# Patient Record
Sex: FEMALE | Race: WHITE | NOT HISPANIC OR LATINO | Employment: OTHER | URBAN - METROPOLITAN AREA
[De-identification: names, ages, dates, MRNs, and addresses within clinical notes are randomized per-mention and may not be internally consistent; named-entity substitution may affect disease eponyms.]

---

## 2023-04-10 LAB
ALANINE AMINOTRANSFERASE (SGPT) (U/L) IN SER/PLAS: 11 U/L (ref 7–45)
ALBUMIN (G/DL) IN SER/PLAS: 2.7 G/DL (ref 3.4–5)
ALKALINE PHOSPHATASE (U/L) IN SER/PLAS: 126 U/L (ref 33–136)
ANION GAP IN SER/PLAS: 10 MMOL/L (ref 10–20)
ASPARTATE AMINOTRANSFERASE (SGOT) (U/L) IN SER/PLAS: 15 U/L (ref 9–39)
BASOPHILS (10*3/UL) IN BLOOD BY AUTOMATED COUNT: 0.04 X10E9/L (ref 0–0.1)
BASOPHILS/100 LEUKOCYTES IN BLOOD BY AUTOMATED COUNT: 0.4 % (ref 0–2)
BILIRUBIN TOTAL (MG/DL) IN SER/PLAS: 0.4 MG/DL (ref 0–1.2)
CALCIUM (MG/DL) IN SER/PLAS: 8.4 MG/DL (ref 8.6–10.3)
CARBON DIOXIDE, TOTAL (MMOL/L) IN SER/PLAS: 29 MMOL/L (ref 21–32)
CHLORIDE (MMOL/L) IN SER/PLAS: 101 MMOL/L (ref 98–107)
CHOLESTEROL (MG/DL) IN SER/PLAS: 99 MG/DL (ref 0–199)
CHOLESTEROL IN HDL (MG/DL) IN SER/PLAS: 45.1 MG/DL
CHOLESTEROL/HDL RATIO: 2.2
CREATININE (MG/DL) IN SER/PLAS: 0.73 MG/DL (ref 0.5–1.05)
EOSINOPHILS (10*3/UL) IN BLOOD BY AUTOMATED COUNT: 0.05 X10E9/L (ref 0–0.4)
EOSINOPHILS/100 LEUKOCYTES IN BLOOD BY AUTOMATED COUNT: 0.5 % (ref 0–6)
ERYTHROCYTE DISTRIBUTION WIDTH (RATIO) BY AUTOMATED COUNT: 15.7 % (ref 11.5–14.5)
ERYTHROCYTE MEAN CORPUSCULAR HEMOGLOBIN CONCENTRATION (G/DL) BY AUTOMATED: 32.9 G/DL (ref 32–36)
ERYTHROCYTE MEAN CORPUSCULAR VOLUME (FL) BY AUTOMATED COUNT: 104 FL (ref 80–100)
ERYTHROCYTES (10*6/UL) IN BLOOD BY AUTOMATED COUNT: 4.06 X10E12/L (ref 4–5.2)
GFR FEMALE: 83 ML/MIN/1.73M2
GLUCOSE (MG/DL) IN SER/PLAS: 124 MG/DL (ref 74–99)
HEMATOCRIT (%) IN BLOOD BY AUTOMATED COUNT: 42.3 % (ref 36–46)
HEMOGLOBIN (G/DL) IN BLOOD: 13.9 G/DL (ref 12–16)
IMMATURE GRANULOCYTES/100 LEUKOCYTES IN BLOOD BY AUTOMATED COUNT: 0.5 % (ref 0–0.9)
IRON (UG/DL) IN SER/PLAS: 39 UG/DL (ref 35–150)
IRON BINDING CAPACITY (UG/DL) IN SER/PLAS: 149 UG/DL (ref 240–445)
IRON SATURATION (%) IN SER/PLAS: 26 % (ref 25–45)
LDL: 36 MG/DL (ref 0–99)
LEUKOCYTES (10*3/UL) IN BLOOD BY AUTOMATED COUNT: 9.5 X10E9/L (ref 4.4–11.3)
LYMPHOCYTES (10*3/UL) IN BLOOD BY AUTOMATED COUNT: 2.49 X10E9/L (ref 0.8–3)
LYMPHOCYTES/100 LEUKOCYTES IN BLOOD BY AUTOMATED COUNT: 26.1 % (ref 13–44)
MONOCYTES (10*3/UL) IN BLOOD BY AUTOMATED COUNT: 0.74 X10E9/L (ref 0.05–0.8)
MONOCYTES/100 LEUKOCYTES IN BLOOD BY AUTOMATED COUNT: 7.8 % (ref 2–10)
NEUTROPHILS (10*3/UL) IN BLOOD BY AUTOMATED COUNT: 6.16 X10E9/L (ref 1.6–5.5)
NEUTROPHILS/100 LEUKOCYTES IN BLOOD BY AUTOMATED COUNT: 64.7 % (ref 40–80)
PLATELETS (10*3/UL) IN BLOOD AUTOMATED COUNT: 466 X10E9/L (ref 150–450)
POTASSIUM (MMOL/L) IN SER/PLAS: 3 MMOL/L (ref 3.5–5.3)
PROTEIN TOTAL: 5.9 G/DL (ref 6.4–8.2)
SODIUM (MMOL/L) IN SER/PLAS: 137 MMOL/L (ref 136–145)
THYROTROPIN (MIU/L) IN SER/PLAS BY DETECTION LIMIT <= 0.05 MIU/L: 5.99 MIU/L (ref 0.44–3.98)
THYROXINE (T4) FREE (NG/DL) IN SER/PLAS: 0.8 NG/DL (ref 0.61–1.12)
TRIGLYCERIDE (MG/DL) IN SER/PLAS: 91 MG/DL (ref 0–149)
UREA NITROGEN (MG/DL) IN SER/PLAS: 17 MG/DL (ref 6–23)
VLDL: 18 MG/DL (ref 0–40)

## 2023-04-21 ENCOUNTER — TELEPHONE (OUTPATIENT)
Dept: PRIMARY CARE | Facility: CLINIC | Age: 78
End: 2023-04-21
Payer: COMMERCIAL

## 2023-05-07 ENCOUNTER — NURSING HOME VISIT (OUTPATIENT)
Dept: POST ACUTE CARE | Facility: EXTERNAL LOCATION | Age: 78
End: 2023-05-07
Payer: COMMERCIAL

## 2023-05-07 DIAGNOSIS — R19.00 ABDOMINAL MASS, UNSPECIFIED ABDOMINAL LOCATION: ICD-10-CM

## 2023-05-07 DIAGNOSIS — Z93.1 FEEDING BY G-TUBE (MULTI): ICD-10-CM

## 2023-05-07 DIAGNOSIS — K52.9 CHRONIC DIARRHEA OF UNKNOWN ORIGIN: ICD-10-CM

## 2023-05-07 DIAGNOSIS — I10 BENIGN ESSENTIAL HTN: ICD-10-CM

## 2023-05-07 DIAGNOSIS — I35.0 MODERATE AORTIC STENOSIS: ICD-10-CM

## 2023-05-07 DIAGNOSIS — E46 PROTEIN-CALORIE MALNUTRITION, UNSPECIFIED SEVERITY (MULTI): ICD-10-CM

## 2023-05-07 DIAGNOSIS — M81.0 AGE-RELATED OSTEOPOROSIS WITHOUT CURRENT PATHOLOGICAL FRACTURE: ICD-10-CM

## 2023-05-07 DIAGNOSIS — K43.9 VENTRAL HERNIA WITHOUT OBSTRUCTION OR GANGRENE: ICD-10-CM

## 2023-05-07 DIAGNOSIS — Z93.3 COLOSTOMY IN PLACE (MULTI): ICD-10-CM

## 2023-05-07 DIAGNOSIS — K56.609 SBO (SMALL BOWEL OBSTRUCTION) (MULTI): Primary | ICD-10-CM

## 2023-05-07 DIAGNOSIS — R53.81 PHYSICAL DEBILITY: ICD-10-CM

## 2023-05-07 DIAGNOSIS — K56.7 ILEUS (MULTI): ICD-10-CM

## 2023-05-07 PROBLEM — S22.42XA MULTIPLE CLOSED FRACTURES OF RIBS OF LEFT SIDE: Status: ACTIVE | Noted: 2023-05-07

## 2023-05-07 PROBLEM — K46.9 ABDOMINAL HERNIA: Status: ACTIVE | Noted: 2023-05-07

## 2023-05-07 PROBLEM — R63.4 WEIGHT LOSS, UNINTENTIONAL: Status: ACTIVE | Noted: 2023-05-07

## 2023-05-07 PROBLEM — R93.5 ABNORMAL CT OF THE ABDOMEN: Status: ACTIVE | Noted: 2023-05-07

## 2023-05-07 PROBLEM — I71.40 ABDOMINAL AORTIC ANEURYSM (AAA) WITHOUT RUPTURE (CMS-HCC): Status: ACTIVE | Noted: 2023-05-07

## 2023-05-07 PROBLEM — R29.810 FACIAL DROOP: Status: ACTIVE | Noted: 2023-05-07

## 2023-05-07 PROBLEM — E03.9 ADULT HYPOTHYROIDISM: Status: ACTIVE | Noted: 2023-05-07

## 2023-05-07 PROBLEM — S72.001A CLOSED FRACTURE OF RIGHT HIP (MULTI): Status: ACTIVE | Noted: 2023-05-07

## 2023-05-07 PROBLEM — F32.A DEPRESSION: Status: ACTIVE | Noted: 2023-05-07

## 2023-05-07 PROCEDURE — 99306 1ST NF CARE HIGH MDM 50: CPT | Performed by: INTERNAL MEDICINE

## 2023-05-07 PROCEDURE — 99497 ADVNCD CARE PLAN 30 MIN: CPT | Performed by: INTERNAL MEDICINE

## 2023-05-07 NOTE — LETTER
Patient: Ping Tapia  : 1945    Encounter Date: 2023    Nursing Home  History & Physical Visit    Name: Ping Tapia  MRN: [id number]  YOB: 1945  Date of Service: 2023      History of Present Illness  Pt is 77 yrs , looking very malnourished, has been having chronic diarrhea was admitted with weakness, weight loss, found to have abdominal mass on CT abd, and small bowel obs . she underwent bx on it and as per daughter they have not heard about results on it yet. She was managed with removal of mass as per daughter and had colostomy bag placed and feeding tube placed. She is here for rehab and nutrition .   Hx of HTN , Moderate aortic steonosis      Review of Systems  REVIEW OF SYSTEMS:   All other systems have been reviewed and are negative in relation to patient's complaint and other than what is mentioned in History of present illness.     Vital Signs  BP: 108/82 mmHg  2023 07:16    Temp:96.3 °F  2023 07:10  Pulse:80 bpm  2023 07:08  Weight:60.2 Lbs  2023 19:04  Resp:16 Breaths/min  2023 07:07  BS:  O2:95 %  2023 07:07  Pain:0  2023 07:01    Physical Exam  Vitals noted   Not in acute distress  Conj Pink, No icterus  Neck: No Cervical LN enlargement, No Thyroid enlargement   Lungs: good air entry bilaterally, no rales or rhonchi  CVS: S1 S2 + , no S3. No loud heart murmur heard.   Abdomen: Soft, non tender , BS +. no organomegaly , no CVA tenderness  CNS: Pt is alert, moving all 4 extremities. no motor weakness or abnormal movements noted on gross examination.  No spine tenderness  Extremities: No edema, No calf tenderness, Madison's sign negative.     Assessment/Plan:    1. SBO (small bowel obstruction) (CMS/HCC)    2. Ileus (CMS/HCC)    3. Chronic diarrhea of unknown origin    4. Age-related osteoporosis without current pathological fracture    5. Ventral hernia without obstruction or gangrene    6. Abdominal mass, unspecified abdominal location    7.  Moderate aortic stenosis    8. Benign essential HTN    9. Protein-calorie malnutrition, unspecified severity (CMS/HCC)    10. Physical debility    11. Colostomy in place (CMS/[facility])    12. Feeding by G-tube (CMS/[facility])       During visit today , I asked patient as well as looked in records  in regards to advanced directive , POA etc. Pt's daughter , ALBERT , was contacted . Part of hx was obtained from her too as pt was not able to give hx. She  wants to be DNR CCA . Questions related to it answered to pt's satisfaction .    Plan:     Available medical records reviewed . Patient was examined and detailed History and physical done . All questions answered to patient's satisfaction . Total time for chart reviewing , detailed examination and coordinating care with patient was > 35 minutes.     Patient is doing well.   Continue OT/PT Rehab.   Current medications are effective. advised to continue current medications.  Will continue to follow   Patient felt satisfied with plan      Electronically Signed By: Delon Anguiano MD   5/8/23  2:42 PM

## 2023-05-08 NOTE — PROGRESS NOTES
Nursing Home  History & Physical Visit    Name: Ping Tapia  MRN: [id number]  YOB: 1945  Date of Service: 5/8/2023      History of Present Illness  Pt is 77 yrs , looking very malnourished, has been having chronic diarrhea was admitted with weakness, weight loss, found to have abdominal mass on CT abd, and small bowel obs . she underwent bx on it and as per daughter they have not heard about results on it yet. She was managed with removal of mass as per daughter and had colostomy bag placed and feeding tube placed. She is here for rehab and nutrition .   Hx of HTN , Moderate aortic steonosis      Review of Systems  REVIEW OF SYSTEMS:   All other systems have been reviewed and are negative in relation to patient's complaint and other than what is mentioned in History of present illness.     Vital Signs  BP: 108/82 mmHg  5/8/2023 07:16    Temp:96.3 °F  5/8/2023 07:10  Pulse:80 bpm  5/8/2023 07:08  Weight:60.2 Lbs  5/6/2023 19:04  Resp:16 Breaths/min  5/8/2023 07:07  BS:  O2:95 %  5/8/2023 07:07  Pain:0  5/8/2023 07:01    Physical Exam  Vitals noted   Not in acute distress  Conj Pink, No icterus  Neck: No Cervical LN enlargement, No Thyroid enlargement   Lungs: good air entry bilaterally, no rales or rhonchi  CVS: S1 S2 + , no S3. No loud heart murmur heard.   Abdomen: Soft, non tender , BS +. no organomegaly , no CVA tenderness  CNS: Pt is alert, moving all 4 extremities. no motor weakness or abnormal movements noted on gross examination.  No spine tenderness  Extremities: No edema, No calf tenderness, Madison's sign negative.     Assessment/Plan:    1. SBO (small bowel obstruction) (CMS/HCC)    2. Ileus (CMS/HCC)    3. Chronic diarrhea of unknown origin    4. Age-related osteoporosis without current pathological fracture    5. Ventral hernia without obstruction or gangrene    6. Abdominal mass, unspecified abdominal location    7. Moderate aortic stenosis    8. Benign essential HTN    9.  Protein-calorie malnutrition, unspecified severity (CMS/HCC)    10. Physical debility    11. Colostomy in place (CMS/HCC)    12. Feeding by G-tube (CMS/[facility])       During visit today , I asked patient as well as looked in records  in regards to advanced directive , POA etc. Pt's daughter , ALBERT , was contacted . Part of hx was obtained from her too as pt was not able to give hx. She  wants to be DNR CCA . Questions related to it answered to pt's satisfaction .    Plan:     Available medical records reviewed . Patient was examined and detailed History and physical done . All questions answered to patient's satisfaction . Total time for chart reviewing , detailed examination and coordinating care with patient was > 35 minutes.     Patient is doing well.   Continue OT/PT Rehab.   Current medications are effective. advised to continue current medications.  Will continue to follow   Patient felt satisfied with plan